# Patient Record
Sex: MALE | Race: WHITE
[De-identification: names, ages, dates, MRNs, and addresses within clinical notes are randomized per-mention and may not be internally consistent; named-entity substitution may affect disease eponyms.]

---

## 2018-02-21 ENCOUNTER — TRANSCRIPTION ENCOUNTER (OUTPATIENT)
Age: 28
End: 2018-02-21

## 2018-08-24 ENCOUNTER — TRANSCRIPTION ENCOUNTER (OUTPATIENT)
Age: 28
End: 2018-08-24

## 2018-09-01 ENCOUNTER — TRANSCRIPTION ENCOUNTER (OUTPATIENT)
Age: 28
End: 2018-09-01

## 2019-03-19 ENCOUNTER — TRANSCRIPTION ENCOUNTER (OUTPATIENT)
Age: 29
End: 2019-03-19

## 2019-04-03 ENCOUNTER — TRANSCRIPTION ENCOUNTER (OUTPATIENT)
Age: 29
End: 2019-04-03

## 2019-06-08 ENCOUNTER — TRANSCRIPTION ENCOUNTER (OUTPATIENT)
Age: 29
End: 2019-06-08

## 2020-01-29 ENCOUNTER — TRANSCRIPTION ENCOUNTER (OUTPATIENT)
Age: 30
End: 2020-01-29

## 2021-08-30 ENCOUNTER — TRANSCRIPTION ENCOUNTER (OUTPATIENT)
Age: 31
End: 2021-08-30

## 2021-11-09 PROBLEM — Z00.00 ENCOUNTER FOR PREVENTIVE HEALTH EXAMINATION: Status: ACTIVE | Noted: 2021-11-09

## 2021-12-03 ENCOUNTER — APPOINTMENT (OUTPATIENT)
Dept: CARDIOLOGY | Facility: CLINIC | Age: 31
End: 2021-12-03
Payer: COMMERCIAL

## 2021-12-03 VITALS
HEART RATE: 60 BPM | TEMPERATURE: 97.6 F | SYSTOLIC BLOOD PRESSURE: 110 MMHG | HEIGHT: 75 IN | BODY MASS INDEX: 31.33 KG/M2 | WEIGHT: 252 LBS | DIASTOLIC BLOOD PRESSURE: 70 MMHG

## 2021-12-03 DIAGNOSIS — Z78.9 OTHER SPECIFIED HEALTH STATUS: ICD-10-CM

## 2021-12-03 DIAGNOSIS — Z86.19 PERSONAL HISTORY OF OTHER INFECTIOUS AND PARASITIC DISEASES: ICD-10-CM

## 2021-12-03 PROCEDURE — 93000 ELECTROCARDIOGRAM COMPLETE: CPT

## 2021-12-03 PROCEDURE — 99204 OFFICE O/P NEW MOD 45 MIN: CPT

## 2021-12-06 PROBLEM — Z78.9 SOCIAL ALCOHOL USE: Status: ACTIVE | Noted: 2021-12-06

## 2021-12-06 PROBLEM — Z86.19 HISTORY OF SHINGLES: Status: RESOLVED | Noted: 2021-12-06 | Resolved: 2021-12-06

## 2021-12-06 NOTE — HISTORY OF PRESENT ILLNESS
[FreeTextEntry1] : 31 year old male () is here for chest pain evaluation. The pain is retrosternal described as uncomfortable feeling ,not related to exertion. He also reported that het gets short of breath easily. He also complains of palpations that feels like his heart stops and has to sit up almost every night. family history is significant for hyperlipidemia. \par \par \par \par \par \par

## 2021-12-06 NOTE — ASSESSMENT
[FreeTextEntry1] : 31 year old male () is here for chest pain evaluation. The pain is retrosternal described as uncomfortable feeling ,not related to exertion. He also reported that het gets short of breath easily. He also complains of palpations that feels like his heart stops and has to sit up almost every night. family history is significant for hyperlipidemia. \par \par Plan: \par CCTA metoprolol provided prior to the test\par Echocardiogram\par Patch monitor \par Lipid prifile \par D-dimer\par

## 2021-12-21 ENCOUNTER — APPOINTMENT (OUTPATIENT)
Dept: CARDIOLOGY | Facility: CLINIC | Age: 31
End: 2021-12-21
Payer: COMMERCIAL

## 2021-12-21 PROCEDURE — 93306 TTE W/DOPPLER COMPLETE: CPT

## 2022-01-04 LAB
BASOPHILS # BLD AUTO: 0.05 K/UL
BASOPHILS NFR BLD AUTO: 0.7 %
DEPRECATED D DIMER PPP IA-ACNC: 121 NG/ML DDU
EOSINOPHIL # BLD AUTO: 0.09 K/UL
EOSINOPHIL NFR BLD AUTO: 1.2 %
HCT VFR BLD CALC: 49.1 %
HGB BLD-MCNC: 16 G/DL
IMM GRANULOCYTES NFR BLD AUTO: 0.7 %
LYMPHOCYTES # BLD AUTO: 2.27 K/UL
LYMPHOCYTES NFR BLD AUTO: 29.6 %
MAN DIFF?: NORMAL
MCHC RBC-ENTMCNC: 30.1 PG
MCHC RBC-ENTMCNC: 32.6 G/DL
MCV RBC AUTO: 92.3 FL
MONOCYTES # BLD AUTO: 0.6 K/UL
MONOCYTES NFR BLD AUTO: 7.8 %
NEUTROPHILS # BLD AUTO: 4.61 K/UL
NEUTROPHILS NFR BLD AUTO: 60 %
PLATELET # BLD AUTO: 261 K/UL
RBC # BLD: 5.32 M/UL
RBC # FLD: 12.6 %
TSH SERPL-ACNC: 1.28 UIU/ML
WBC # FLD AUTO: 7.67 K/UL

## 2022-01-05 LAB
ALBUMIN SERPL ELPH-MCNC: 5 G/DL
ALP BLD-CCNC: 85 U/L
ALT SERPL-CCNC: 30 U/L
ANION GAP SERPL CALC-SCNC: 13 MMOL/L
AST SERPL-CCNC: 19 U/L
BILIRUB SERPL-MCNC: 0.6 MG/DL
BUN SERPL-MCNC: 17 MG/DL
CALCIUM SERPL-MCNC: 9.9 MG/DL
CHLORIDE SERPL-SCNC: 103 MMOL/L
CHOLEST SERPL-MCNC: 253 MG/DL
CO2 SERPL-SCNC: 24 MMOL/L
CREAT SERPL-MCNC: 1.1 MG/DL
ESTIMATED AVERAGE GLUCOSE: 100 MG/DL
GLUCOSE SERPL-MCNC: 97 MG/DL
HBA1C MFR BLD HPLC: 5.1 %
HDLC SERPL-MCNC: 35 MG/DL
LDLC SERPL CALC-MCNC: 182 MG/DL
NONHDLC SERPL-MCNC: 218 MG/DL
POTASSIUM SERPL-SCNC: 5.3 MMOL/L
PROT SERPL-MCNC: 7.9 G/DL
SODIUM SERPL-SCNC: 140 MMOL/L
TRIGL SERPL-MCNC: 243 MG/DL

## 2022-01-20 ENCOUNTER — RESULT REVIEW (OUTPATIENT)
Age: 32
End: 2022-01-20

## 2022-01-20 ENCOUNTER — OUTPATIENT (OUTPATIENT)
Dept: OUTPATIENT SERVICES | Facility: HOSPITAL | Age: 32
LOS: 1 days | Discharge: HOME | End: 2022-01-20
Payer: COMMERCIAL

## 2022-01-20 DIAGNOSIS — R06.00 DYSPNEA, UNSPECIFIED: ICD-10-CM

## 2022-01-20 DIAGNOSIS — R07.9 CHEST PAIN, UNSPECIFIED: ICD-10-CM

## 2022-01-20 PROCEDURE — 75574 CT ANGIO HRT W/3D IMAGE: CPT | Mod: 26

## 2022-01-21 ENCOUNTER — OUTPATIENT (OUTPATIENT)
Dept: OUTPATIENT SERVICES | Facility: HOSPITAL | Age: 32
LOS: 1 days | Discharge: HOME | End: 2022-01-21

## 2022-01-21 ENCOUNTER — RESULT REVIEW (OUTPATIENT)
Age: 32
End: 2022-01-21

## 2022-01-22 DIAGNOSIS — R06.00 DYSPNEA, UNSPECIFIED: ICD-10-CM

## 2022-01-22 DIAGNOSIS — R07.9 CHEST PAIN, UNSPECIFIED: ICD-10-CM

## 2022-02-04 ENCOUNTER — APPOINTMENT (OUTPATIENT)
Dept: CARDIOLOGY | Facility: CLINIC | Age: 32
End: 2022-02-04
Payer: COMMERCIAL

## 2022-02-04 VITALS
SYSTOLIC BLOOD PRESSURE: 110 MMHG | HEIGHT: 75 IN | WEIGHT: 250 LBS | BODY MASS INDEX: 31.08 KG/M2 | DIASTOLIC BLOOD PRESSURE: 70 MMHG | TEMPERATURE: 97.8 F

## 2022-02-04 DIAGNOSIS — E78.5 HYPERLIPIDEMIA, UNSPECIFIED: ICD-10-CM

## 2022-02-04 DIAGNOSIS — R07.9 CHEST PAIN, UNSPECIFIED: ICD-10-CM

## 2022-02-04 DIAGNOSIS — R06.00 DYSPNEA, UNSPECIFIED: ICD-10-CM

## 2022-02-04 PROCEDURE — 99214 OFFICE O/P EST MOD 30 MIN: CPT

## 2022-02-04 RX ORDER — METOPROLOL TARTRATE 50 MG/1
50 TABLET, FILM COATED ORAL DAILY
Qty: 2 | Refills: 0 | Status: DISCONTINUED | COMMUNITY
Start: 2021-12-03 | End: 2022-02-04

## 2022-02-04 NOTE — CARDIOLOGY SUMMARY
Pt was initially informed 4/4/19. Ade Alvarez, CMA     [de-identified] : 3/12/21 NSR  [de-identified] : 12/21/22 EF 60%  [de-identified] : 1/29/22 Non calcified plaque in prox LAD. Intramyocardial bridge noted mid/distal LAD.

## 2022-02-04 NOTE — ASSESSMENT
[FreeTextEntry1] : 31 year old male () is here for chest pain evaluation. The pain is retrosternal described as uncomfortable feeling ,not related to exertion. He also reported that het gets short of breath easily. He also complains of palpations that feels like his heart stops and has to sit up almost every night. family history is significant for hyperlipidemia. Underwent CCTA with FFRCT showing non obstructive CAD and intramyocardial bridge. Lipid profile showed significant LDL elevation. \par \par Plan: \par Discussed risk factor modification diet and exercise \par Did not start statin therapy and want to try diet and exercise first. Advised that risk factor modification may not be sufficient. \par Discussed the diagnosis of familiar hyperlipidemia and advised screening for 1st degree relatives \par Repeat Lipid profile and familial hyperlipemia workup \par If symptoms persist will consider exercise stress test to evaluate the effect of intramyocardial bridge\par \par

## 2022-02-04 NOTE — HISTORY OF PRESENT ILLNESS
[FreeTextEntry1] : 31 year old male () is here for chest pain evaluation. The pain is retrosternal described as uncomfortable feeling ,not related to exertion. He also reported that het gets short of breath easily. He also complains of palpations that feels like his heart stops and has to sit up almost every night. family history is significant for hyperlipidemia. Underwent CCTA with FFRCT showing non obstructive CAD and intramyocardial bridge. Lipid profile showed significant LDL elevation.

## 2022-02-09 ENCOUNTER — APPOINTMENT (OUTPATIENT)
Dept: CARDIOLOGY | Facility: CLINIC | Age: 32
End: 2022-02-09
Payer: COMMERCIAL

## 2022-02-09 VITALS
SYSTOLIC BLOOD PRESSURE: 110 MMHG | WEIGHT: 252 LBS | HEART RATE: 62 BPM | BODY MASS INDEX: 31.33 KG/M2 | OXYGEN SATURATION: 99 % | TEMPERATURE: 97.5 F | DIASTOLIC BLOOD PRESSURE: 70 MMHG | HEIGHT: 75 IN

## 2022-02-09 DIAGNOSIS — I44.1 ATRIOVENTRICULAR BLOCK, SECOND DEGREE: ICD-10-CM

## 2022-02-09 PROCEDURE — ZZZZZ: CPT

## 2022-02-09 PROCEDURE — 93000 ELECTROCARDIOGRAM COMPLETE: CPT

## 2022-02-09 PROCEDURE — 99215 OFFICE O/P EST HI 40 MIN: CPT

## 2022-02-09 RX ORDER — ATORVASTATIN CALCIUM 40 MG/1
40 TABLET, FILM COATED ORAL
Qty: 30 | Refills: 3 | Status: DISCONTINUED | COMMUNITY
Start: 2022-01-05 | End: 2022-02-09

## 2022-02-12 NOTE — HISTORY OF PRESENT ILLNESS
[FreeTextEntry1] : I had a pleasure of seeing Mr. DELGADO for consultation for abnormal holter. He works as a .\par \par Mr. DELGADO is a 31 year-year old male with history of DL, LAD bridge, untreated/late Rx of lyme's disease is here for abnormal holter.\par \par + Palpitations - sudden dizziness, brief moment, no clear ppt factor, for few months\par had monitor which showed events of Mobitz 2 AV block during sleeping hours.\par \par Denies chest pain, shortness of breath, palpitation, dizziness or LOC except noted above.\par \par FHx: No FHx of SCD, ICD implant, premature CAD\par SHx: No smoking, alcoholism, IVDA, marijuana use, excessive caffeinated products/herbal products\par \par EKG (2/9/22): SR@ 62, , QRSd 96\par TTE (12/21): Nl EF\par CCTA/CT-FFR (12/21): Non-obs lesion in LAD\par Cardio: Dr. Barrera\par

## 2022-02-12 NOTE — ASSESSMENT
[FreeTextEntry1] : ## Type 2 AV block\par ## Palpitations \par \par - Reviewed his prior MCOT which showed multiple events of Type 2 AV block- during sleeping hours. He continues to have symptoms- however,  he cant recall events during monitoring. Also, unclear if he had day time symptoms, which are currently happening. In this scenario, we discussed and agreed for repeat MCOT to correlate symptoms with events\par - I doubt that lyme disease is the cause of current symptoms\par - MRI to eval for sarcoidosis\par - MCOT for 4 weeks\par - return in 5 weeks

## 2022-03-02 ENCOUNTER — TRANSCRIPTION ENCOUNTER (OUTPATIENT)
Age: 32
End: 2022-03-02

## 2022-03-16 ENCOUNTER — APPOINTMENT (OUTPATIENT)
Dept: CARDIOLOGY | Facility: CLINIC | Age: 32
End: 2022-03-16
Payer: COMMERCIAL

## 2022-03-16 VITALS
HEART RATE: 72 BPM | BODY MASS INDEX: 31.33 KG/M2 | WEIGHT: 252 LBS | HEIGHT: 75 IN | TEMPERATURE: 98 F | SYSTOLIC BLOOD PRESSURE: 110 MMHG | DIASTOLIC BLOOD PRESSURE: 70 MMHG | OXYGEN SATURATION: 98 %

## 2022-03-16 PROCEDURE — 99214 OFFICE O/P EST MOD 30 MIN: CPT

## 2022-03-16 PROCEDURE — 93000 ELECTROCARDIOGRAM COMPLETE: CPT

## 2022-03-16 NOTE — HISTORY OF PRESENT ILLNESS
[FreeTextEntry1] : I had a pleasure of seeing Mr. DELGADO for consultation for abnormal holter. He works as a .\par \par Mr. 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 During monitor, minor episodes.\par \par Denies chest pain, shortness of breath, palpitation, dizziness or LOC except noted above.\par \par FHx: No FHx of SCD, ICD implant, premature CAD\par SHx: No smoking, alcoholism, IVDA, marijuana use, excessive caffeinated products/herbal products\par \par EKG (3/16/22): SR@ 87\par EKG (2/9/22): SR@ 62, , QRSd 96\par TTE (12/21): Nl EF\par CCTA/CT-FFR (12/21): Non-obs lesion in LAD\par Cardio: Dr. Barrera\par

## 2022-04-07 ENCOUNTER — TRANSCRIPTION ENCOUNTER (OUTPATIENT)
Age: 32
End: 2022-04-07

## 2022-04-16 RX ORDER — ATORVASTATIN CALCIUM 40 MG/1
40 TABLET, FILM COATED ORAL
Qty: 90 | Refills: 3 | Status: ACTIVE | COMMUNITY
Start: 2022-04-13 | End: 1900-01-01

## 2022-05-27 ENCOUNTER — NON-APPOINTMENT (OUTPATIENT)
Age: 32
End: 2022-05-27

## 2022-06-03 ENCOUNTER — APPOINTMENT (OUTPATIENT)
Dept: CARDIOLOGY | Facility: CLINIC | Age: 32
End: 2022-06-03

## 2022-06-28 ENCOUNTER — NON-APPOINTMENT (OUTPATIENT)
Age: 32
End: 2022-06-28

## 2022-06-28 ENCOUNTER — APPOINTMENT (OUTPATIENT)
Age: 32
End: 2022-06-28
Payer: COMMERCIAL

## 2022-06-28 VITALS
DIASTOLIC BLOOD PRESSURE: 76 MMHG | HEART RATE: 70 BPM | WEIGHT: 245 LBS | OXYGEN SATURATION: 97 % | SYSTOLIC BLOOD PRESSURE: 126 MMHG | HEIGHT: 75 IN | BODY MASS INDEX: 30.46 KG/M2 | RESPIRATION RATE: 14 BRPM

## 2022-06-28 DIAGNOSIS — G47.33 OBSTRUCTIVE SLEEP APNEA (ADULT) (PEDIATRIC): ICD-10-CM

## 2022-06-28 DIAGNOSIS — Z83.438 FAMILY HISTORY OF OTHER DISORDER OF LIPOPROTEIN METABOLISM AND OTHER LIPIDEMIA: ICD-10-CM

## 2022-06-28 DIAGNOSIS — Z86.39 PERSONAL HISTORY OF OTHER ENDOCRINE, NUTRITIONAL AND METABOLIC DISEASE: ICD-10-CM

## 2022-06-28 DIAGNOSIS — Z86.19 PERSONAL HISTORY OF OTHER INFECTIOUS AND PARASITIC DISEASES: ICD-10-CM

## 2022-06-28 PROCEDURE — 99203 OFFICE O/P NEW LOW 30 MIN: CPT

## 2022-06-28 NOTE — HISTORY OF PRESENT ILLNESS
[Initial Evaluation] : an initial evaluation of [Excessive Sleepiness-Day] : excessive daytime sleepiness [Witnessed Gasping] : witnessed gasping during sleep [Unrefreshing Sleep] : unrefreshing sleep [Currently Experiencing] : The patient is currently experiencing symptoms. [Gradual] : gradual [Moderate] : moderate in severity [Worsening] : worsening [Sleeping on Back] : sleeping on back [Fatigue] : fatigue [Weight Gain] : weight gain [Shortness of Breath] : shortness of breath [None] : The patient is not currently being treated for this problem [Cardiac Arrhythmia] : cardiac arrhythmia [TextBox_4] : REFERRED BY CARDIO RO GIGI REPORTS SYMPTOMS

## 2022-09-20 ENCOUNTER — APPOINTMENT (OUTPATIENT)
Dept: SLEEP CENTER | Facility: HOSPITAL | Age: 32
End: 2022-09-20

## 2022-09-20 ENCOUNTER — OUTPATIENT (OUTPATIENT)
Dept: OUTPATIENT SERVICES | Facility: HOSPITAL | Age: 32
LOS: 1 days | Discharge: HOME | End: 2022-09-20

## 2022-09-20 PROCEDURE — 95810 POLYSOM 6/> YRS 4/> PARAM: CPT | Mod: 26

## 2022-09-21 DIAGNOSIS — G47.33 OBSTRUCTIVE SLEEP APNEA (ADULT) (PEDIATRIC): ICD-10-CM

## 2023-03-29 ENCOUNTER — APPOINTMENT (OUTPATIENT)
Dept: OTOLARYNGOLOGY | Facility: CLINIC | Age: 33
End: 2023-03-29

## 2023-04-07 ENCOUNTER — NON-APPOINTMENT (OUTPATIENT)
Age: 33
End: 2023-04-07

## 2023-06-29 ENCOUNTER — EMERGENCY (EMERGENCY)
Facility: HOSPITAL | Age: 33
LOS: 0 days | Discharge: HOME | End: 2023-06-29
Attending: EMERGENCY MEDICINE
Payer: COMMERCIAL

## 2023-06-29 VITALS
DIASTOLIC BLOOD PRESSURE: 72 MMHG | RESPIRATION RATE: 18 BRPM | OXYGEN SATURATION: 99 % | SYSTOLIC BLOOD PRESSURE: 124 MMHG | HEART RATE: 72 BPM

## 2023-06-29 VITALS
WEIGHT: 250 LBS | OXYGEN SATURATION: 98 % | DIASTOLIC BLOOD PRESSURE: 70 MMHG | SYSTOLIC BLOOD PRESSURE: 126 MMHG | HEART RATE: 63 BPM | RESPIRATION RATE: 16 BRPM | TEMPERATURE: 98 F | HEIGHT: 75 IN

## 2023-06-29 DIAGNOSIS — R10.12 LEFT UPPER QUADRANT PAIN: ICD-10-CM

## 2023-06-29 DIAGNOSIS — R10.32 LEFT LOWER QUADRANT PAIN: ICD-10-CM

## 2023-06-29 DIAGNOSIS — M54.50 LOW BACK PAIN, UNSPECIFIED: ICD-10-CM

## 2023-06-29 DIAGNOSIS — R11.0 NAUSEA: ICD-10-CM

## 2023-06-29 DIAGNOSIS — E78.5 HYPERLIPIDEMIA, UNSPECIFIED: ICD-10-CM

## 2023-06-29 DIAGNOSIS — Z87.442 PERSONAL HISTORY OF URINARY CALCULI: ICD-10-CM

## 2023-06-29 LAB
ALBUMIN SERPL ELPH-MCNC: 4.7 G/DL — SIGNIFICANT CHANGE UP (ref 3.5–5.2)
ALP SERPL-CCNC: 75 U/L — SIGNIFICANT CHANGE UP (ref 30–115)
ALT FLD-CCNC: 34 U/L — SIGNIFICANT CHANGE UP (ref 0–41)
ANION GAP SERPL CALC-SCNC: 13 MMOL/L — SIGNIFICANT CHANGE UP (ref 7–14)
APPEARANCE UR: CLEAR — SIGNIFICANT CHANGE UP
AST SERPL-CCNC: 23 U/L — SIGNIFICANT CHANGE UP (ref 0–41)
BASOPHILS # BLD AUTO: 0.04 K/UL — SIGNIFICANT CHANGE UP (ref 0–0.2)
BASOPHILS NFR BLD AUTO: 0.5 % — SIGNIFICANT CHANGE UP (ref 0–1)
BILIRUB SERPL-MCNC: 0.6 MG/DL — SIGNIFICANT CHANGE UP (ref 0.2–1.2)
BILIRUB UR-MCNC: NEGATIVE — SIGNIFICANT CHANGE UP
BUN SERPL-MCNC: 17 MG/DL — SIGNIFICANT CHANGE UP (ref 10–20)
CALCIUM SERPL-MCNC: 9.6 MG/DL — SIGNIFICANT CHANGE UP (ref 8.4–10.5)
CHLORIDE SERPL-SCNC: 104 MMOL/L — SIGNIFICANT CHANGE UP (ref 98–110)
CO2 SERPL-SCNC: 23 MMOL/L — SIGNIFICANT CHANGE UP (ref 17–32)
COLOR SPEC: SIGNIFICANT CHANGE UP
CREAT SERPL-MCNC: 1 MG/DL — SIGNIFICANT CHANGE UP (ref 0.7–1.5)
DIFF PNL FLD: NEGATIVE — SIGNIFICANT CHANGE UP
EGFR: 101 ML/MIN/1.73M2 — SIGNIFICANT CHANGE UP
EOSINOPHIL # BLD AUTO: 0.09 K/UL — SIGNIFICANT CHANGE UP (ref 0–0.7)
EOSINOPHIL NFR BLD AUTO: 1.1 % — SIGNIFICANT CHANGE UP (ref 0–8)
GLUCOSE SERPL-MCNC: 110 MG/DL — HIGH (ref 70–99)
GLUCOSE UR QL: NEGATIVE — SIGNIFICANT CHANGE UP
HCT VFR BLD CALC: 43 % — SIGNIFICANT CHANGE UP (ref 42–52)
HGB BLD-MCNC: 14.7 G/DL — SIGNIFICANT CHANGE UP (ref 14–18)
IMM GRANULOCYTES NFR BLD AUTO: 0.4 % — HIGH (ref 0.1–0.3)
KETONES UR-MCNC: NEGATIVE — SIGNIFICANT CHANGE UP
LEUKOCYTE ESTERASE UR-ACNC: NEGATIVE — SIGNIFICANT CHANGE UP
LIDOCAIN IGE QN: 33 U/L — SIGNIFICANT CHANGE UP (ref 7–60)
LYMPHOCYTES # BLD AUTO: 2.41 K/UL — SIGNIFICANT CHANGE UP (ref 1.2–3.4)
LYMPHOCYTES # BLD AUTO: 30.6 % — SIGNIFICANT CHANGE UP (ref 20.5–51.1)
MCHC RBC-ENTMCNC: 30.6 PG — SIGNIFICANT CHANGE UP (ref 27–31)
MCHC RBC-ENTMCNC: 34.2 G/DL — SIGNIFICANT CHANGE UP (ref 32–37)
MCV RBC AUTO: 89.4 FL — SIGNIFICANT CHANGE UP (ref 80–94)
MONOCYTES # BLD AUTO: 0.55 K/UL — SIGNIFICANT CHANGE UP (ref 0.1–0.6)
MONOCYTES NFR BLD AUTO: 7 % — SIGNIFICANT CHANGE UP (ref 1.7–9.3)
NEUTROPHILS # BLD AUTO: 4.76 K/UL — SIGNIFICANT CHANGE UP (ref 1.4–6.5)
NEUTROPHILS NFR BLD AUTO: 60.4 % — SIGNIFICANT CHANGE UP (ref 42.2–75.2)
NITRITE UR-MCNC: NEGATIVE — SIGNIFICANT CHANGE UP
NRBC # BLD: 0 /100 WBCS — SIGNIFICANT CHANGE UP (ref 0–0)
PH UR: 6.5 — SIGNIFICANT CHANGE UP (ref 5–8)
PLATELET # BLD AUTO: 249 K/UL — SIGNIFICANT CHANGE UP (ref 130–400)
PMV BLD: 10.2 FL — SIGNIFICANT CHANGE UP (ref 7.4–10.4)
POTASSIUM SERPL-MCNC: 4.3 MMOL/L — SIGNIFICANT CHANGE UP (ref 3.5–5)
POTASSIUM SERPL-SCNC: 4.3 MMOL/L — SIGNIFICANT CHANGE UP (ref 3.5–5)
PROT SERPL-MCNC: 6.9 G/DL — SIGNIFICANT CHANGE UP (ref 6–8)
PROT UR-MCNC: NEGATIVE — SIGNIFICANT CHANGE UP
RBC # BLD: 4.81 M/UL — SIGNIFICANT CHANGE UP (ref 4.7–6.1)
RBC # FLD: 12.8 % — SIGNIFICANT CHANGE UP (ref 11.5–14.5)
SODIUM SERPL-SCNC: 140 MMOL/L — SIGNIFICANT CHANGE UP (ref 135–146)
SP GR SPEC: 1.02 — SIGNIFICANT CHANGE UP (ref 1.01–1.03)
UROBILINOGEN FLD QL: SIGNIFICANT CHANGE UP
WBC # BLD: 7.88 K/UL — SIGNIFICANT CHANGE UP (ref 4.8–10.8)
WBC # FLD AUTO: 7.88 K/UL — SIGNIFICANT CHANGE UP (ref 4.8–10.8)

## 2023-06-29 PROCEDURE — 96374 THER/PROPH/DIAG INJ IV PUSH: CPT | Mod: XU

## 2023-06-29 PROCEDURE — 74177 CT ABD & PELVIS W/CONTRAST: CPT | Mod: 26,MA

## 2023-06-29 PROCEDURE — 85025 COMPLETE CBC W/AUTO DIFF WBC: CPT

## 2023-06-29 PROCEDURE — 80053 COMPREHEN METABOLIC PANEL: CPT

## 2023-06-29 PROCEDURE — 36415 COLL VENOUS BLD VENIPUNCTURE: CPT

## 2023-06-29 PROCEDURE — 99284 EMERGENCY DEPT VISIT MOD MDM: CPT | Mod: 25

## 2023-06-29 PROCEDURE — 96376 TX/PRO/DX INJ SAME DRUG ADON: CPT

## 2023-06-29 PROCEDURE — 87086 URINE CULTURE/COLONY COUNT: CPT

## 2023-06-29 PROCEDURE — 83690 ASSAY OF LIPASE: CPT

## 2023-06-29 PROCEDURE — 99285 EMERGENCY DEPT VISIT HI MDM: CPT

## 2023-06-29 PROCEDURE — 74177 CT ABD & PELVIS W/CONTRAST: CPT | Mod: MA

## 2023-06-29 PROCEDURE — 81003 URINALYSIS AUTO W/O SCOPE: CPT

## 2023-06-29 RX ORDER — METHOCARBAMOL 500 MG/1
2 TABLET, FILM COATED ORAL
Qty: 18 | Refills: 0
Start: 2023-06-29 | End: 2023-07-01

## 2023-06-29 RX ORDER — SODIUM CHLORIDE 9 MG/ML
1000 INJECTION, SOLUTION INTRAVENOUS ONCE
Refills: 0 | Status: COMPLETED | OUTPATIENT
Start: 2023-06-29 | End: 2023-06-29

## 2023-06-29 RX ORDER — IBUPROFEN 200 MG
1 TABLET ORAL
Qty: 16 | Refills: 0
Start: 2023-06-29 | End: 2023-07-02

## 2023-06-29 RX ORDER — KETOROLAC TROMETHAMINE 30 MG/ML
15 SYRINGE (ML) INJECTION ONCE
Refills: 0 | Status: DISCONTINUED | OUTPATIENT
Start: 2023-06-29 | End: 2023-06-29

## 2023-06-29 RX ORDER — METHOCARBAMOL 500 MG/1
1000 TABLET, FILM COATED ORAL ONCE
Refills: 0 | Status: COMPLETED | OUTPATIENT
Start: 2023-06-29 | End: 2023-06-29

## 2023-06-29 RX ADMIN — SODIUM CHLORIDE 1000 MILLILITER(S): 9 INJECTION, SOLUTION INTRAVENOUS at 14:46

## 2023-06-29 RX ADMIN — Medication 15 MILLIGRAM(S): at 15:46

## 2023-06-29 RX ADMIN — METHOCARBAMOL 1000 MILLIGRAM(S): 500 TABLET, FILM COATED ORAL at 15:46

## 2023-06-29 RX ADMIN — Medication 15 MILLIGRAM(S): at 14:18

## 2023-06-29 NOTE — ED PROVIDER NOTE - CLINICAL SUMMARY MEDICAL DECISION MAKING FREE TEXT BOX
Pt with L lower back pain, neg labs and imaging, likely msk back pain.  symptomatic tx and f/u with pmd/pt/rehab.  Any ordered labs and EKG were reviewed.  Any imaging was ordered and reviewed by me.  Appropriate medications for patient's presenting complaints were ordered and effects were reassessed.  Patient's records (prior hospital, ED visit, and/or nursing home notes if available) were reviewed.  Additional history was obtained from EMS, family, and/or PCP (where available).  Escalation to admission/observation was considered.  1) However patient feels much better and is comfortable with discharge.  Appropriate follow-up was arranged.

## 2023-06-29 NOTE — ED PROVIDER NOTE - ATTENDING APP SHARED VISIT CONTRIBUTION OF CARE
33 yo m with pmh of kidney stones, f/u with nephro dr. gonzalez outpt, presents with L flank pain.  pt says started 2 days.  had nausea, but no vomiting.  no fever or chills.  admits worse with movement.  had golfed a few days ago.  no abd pain.  exam: nad, ncat, perrl, eomi, mmm, rrr, ctab, abd soft, nt, nd aox3, L cvat ttp imp: pt with h/o kidney stones, will check labs, ua, ct

## 2023-06-29 NOTE — ED PROVIDER NOTE - PATIENT PORTAL LINK FT
You can access the FollowMyHealth Patient Portal offered by [facility] by registering at the following website: [email or web address]. By joining [facility]’s FollowMyHealth portal, you will also be able to view your health information using other applications (apps) compatible with our system.

## 2023-06-29 NOTE — ED PROVIDER NOTE - OBJECTIVE STATEMENT
32-year-old female with PMH of renal stones, hyperlipidemia presents ED for evaluation of left-sided flank pain x2 days.  Patient states he was playing golf when pain occurred, it is sharp, intermittent, nonradiating, 6 out of 10 in severity currently but waxes and wanes and can be 9 out of 10 in severity. (+)nausea yesterday which has since resolved. Has been taking ibuprofen at home with intermittent relief.  Pain feels similar to prior kidney stones years ago however not as severe as last time.  Denies fever/chills, CP, SOB, abdominal pain, vomiting, diarrhea, black or bloody stools, testicular pain or swelling, rash.

## 2023-06-29 NOTE — ED PROVIDER NOTE - NS ED ATTENDING STATEMENT MOD
This was a shared visit with the REECE. I reviewed and verified the documentation and independently performed the documented:

## 2023-06-29 NOTE — ED PROVIDER NOTE - PHYSICAL EXAMINATION
VITAL SIGNS: I have reviewed nursing notes and confirm.  CONSTITUTIONAL: Well-developed; well-nourished; in no acute distress.  SKIN: Skin exam is warm and dry, no acute rash.  HEAD: Normocephalic; atraumatic.  EYES: PERRL, Econjunctiva and sclera clear.  ENT: mmm  CARD: S1, S2 normal; no murmurs, gallops, or rubs. Regular rate and rhythm.  RESP: Normal respiratory effort, no tachypnea or distress. Lungs CTAB, no wheezes, rales or rhonchi.  ABD: soft, ND, +minimal suprapubic ttp without rebound/guarding/rigidity. Back with (+)L CVAT no R CVAT.   EXT: Normal ROM. No clubbing, cyanosis or edema.  NEURO: Alert, oriented. Grossly unremarkable. No focal deficits.  PSYCH: Cooperative, appropriate.

## 2023-06-29 NOTE — ED ADULT NURSE NOTE - NSFALLUNIVINTERV_ED_ALL_ED
Bed/Stretcher in lowest position, wheels locked, appropriate side rails in place/Call bell, personal items and telephone in reach/Instruct patient to call for assistance before getting out of bed/chair/stretcher/Non-slip footwear applied when patient is off stretcher/[place] to call system/Physically safe environment - no spills, clutter or unnecessary equipment/Purposeful proactive rounding/Room/bathroom lighting operational, light cord in reach

## 2023-06-29 NOTE — ED PROVIDER NOTE - CARE PROVIDER_API CALL
Ana Pool  [facility]  [address] [place] [address]  Phone: [phone]  Fax: [phone]  Established Patient  Follow Up Time: 1-3 Days

## 2023-06-29 NOTE — ED PROVIDER NOTE - PROGRESS NOTE DETAILS
INDIRA: Patient feels improved after Toradol, IV fluids.  Labs, UA, CT of abdomen and pelvis are all WNL.  No signs of infection or stone.  Discussed with patient, Toradol and Robaxin sent to pharmacy.  Supportive care return precautions advised.  Patient comfortable with plan.    Patient to be discharged from ED. Any available test results were discussed with patient and/or family. Verbal instructions given, including instructions to return to ED immediately for any new, worsening, or concerning symptoms. Patient endorsed understanding. Written discharge instructions additionally given, including follow-up plan.

## 2023-06-29 NOTE — ED ADULT NURSE NOTE - OBJECTIVE STATEMENT
Received report from prior RN.     Pt presenting to ED c/o flank pain. Pt c/o left sided flank pain x2 days with associated nausea. Denies d/fevers/chills. Pt A&OX4, ambulatory.

## 2023-07-01 LAB
CULTURE RESULTS: NO GROWTH — SIGNIFICANT CHANGE UP
SPECIMEN SOURCE: SIGNIFICANT CHANGE UP

## 2023-09-19 ENCOUNTER — NON-APPOINTMENT (OUTPATIENT)
Age: 33
End: 2023-09-19

## 2025-01-02 ENCOUNTER — NON-APPOINTMENT (OUTPATIENT)
Age: 35
End: 2025-01-02

## 2025-06-18 ENCOUNTER — NON-APPOINTMENT (OUTPATIENT)
Age: 35
End: 2025-06-18

## 2025-06-23 ENCOUNTER — INPATIENT (INPATIENT)
Facility: HOSPITAL | Age: 35
LOS: 0 days | Discharge: HOME | DRG: 603 | End: 2025-06-24
Attending: INTERNAL MEDICINE | Admitting: STUDENT IN AN ORGANIZED HEALTH CARE EDUCATION/TRAINING PROGRAM
Payer: COMMERCIAL

## 2025-06-23 VITALS
OXYGEN SATURATION: 98 % | HEIGHT: 77 IN | RESPIRATION RATE: 18 BRPM | HEART RATE: 66 BPM | DIASTOLIC BLOOD PRESSURE: 83 MMHG | WEIGHT: 248.9 LBS | SYSTOLIC BLOOD PRESSURE: 121 MMHG | TEMPERATURE: 97 F

## 2025-06-23 DIAGNOSIS — L03.90 CELLULITIS, UNSPECIFIED: ICD-10-CM

## 2025-06-23 PROBLEM — E78.5 HYPERLIPIDEMIA, UNSPECIFIED: Chronic | Status: ACTIVE | Noted: 2023-06-29

## 2025-06-23 PROBLEM — N20.0 CALCULUS OF KIDNEY: Chronic | Status: ACTIVE | Noted: 2023-06-29

## 2025-06-23 LAB
ALBUMIN SERPL ELPH-MCNC: 5.1 G/DL — SIGNIFICANT CHANGE UP (ref 3.5–5.2)
ALP SERPL-CCNC: 100 U/L — SIGNIFICANT CHANGE UP (ref 30–115)
ALT FLD-CCNC: 25 U/L — SIGNIFICANT CHANGE UP (ref 0–41)
ANION GAP SERPL CALC-SCNC: 13 MMOL/L — SIGNIFICANT CHANGE UP (ref 7–14)
AST SERPL-CCNC: 18 U/L — SIGNIFICANT CHANGE UP (ref 0–41)
BASOPHILS # BLD AUTO: 0.05 K/UL — SIGNIFICANT CHANGE UP (ref 0–0.2)
BASOPHILS NFR BLD AUTO: 0.6 % — SIGNIFICANT CHANGE UP (ref 0–1)
BILIRUB SERPL-MCNC: 0.5 MG/DL — SIGNIFICANT CHANGE UP (ref 0.2–1.2)
BUN SERPL-MCNC: 16 MG/DL — SIGNIFICANT CHANGE UP (ref 10–20)
CALCIUM SERPL-MCNC: 9.9 MG/DL — SIGNIFICANT CHANGE UP (ref 8.4–10.5)
CHLORIDE SERPL-SCNC: 102 MMOL/L — SIGNIFICANT CHANGE UP (ref 98–110)
CO2 SERPL-SCNC: 26 MMOL/L — SIGNIFICANT CHANGE UP (ref 17–32)
CREAT SERPL-MCNC: 1.1 MG/DL — SIGNIFICANT CHANGE UP (ref 0.7–1.5)
CRP SERPL-MCNC: 3.8 MG/L — SIGNIFICANT CHANGE UP
EGFR: 89 ML/MIN/1.73M2 — SIGNIFICANT CHANGE UP
EGFR: 89 ML/MIN/1.73M2 — SIGNIFICANT CHANGE UP
EOSINOPHIL # BLD AUTO: 0.05 K/UL — SIGNIFICANT CHANGE UP (ref 0–0.7)
EOSINOPHIL NFR BLD AUTO: 0.6 % — SIGNIFICANT CHANGE UP (ref 0–8)
ERYTHROCYTE [SEDIMENTATION RATE] IN BLOOD: 20 MM/HR — HIGH (ref 0–15)
GLUCOSE SERPL-MCNC: 82 MG/DL — SIGNIFICANT CHANGE UP (ref 70–99)
HCT VFR BLD CALC: 44.8 % — SIGNIFICANT CHANGE UP (ref 42–52)
HGB BLD-MCNC: 15 G/DL — SIGNIFICANT CHANGE UP (ref 14–18)
IMM GRANULOCYTES NFR BLD AUTO: 0.6 % — HIGH (ref 0.1–0.3)
LACTATE SERPL-SCNC: 1.1 MMOL/L — SIGNIFICANT CHANGE UP (ref 0.7–2)
LYMPHOCYTES # BLD AUTO: 1.97 K/UL — SIGNIFICANT CHANGE UP (ref 1.2–3.4)
LYMPHOCYTES # BLD AUTO: 24.8 % — SIGNIFICANT CHANGE UP (ref 20.5–51.1)
MCHC RBC-ENTMCNC: 29.8 PG — SIGNIFICANT CHANGE UP (ref 27–31)
MCHC RBC-ENTMCNC: 33.5 G/DL — SIGNIFICANT CHANGE UP (ref 32–37)
MCV RBC AUTO: 88.9 FL — SIGNIFICANT CHANGE UP (ref 80–94)
MONOCYTES # BLD AUTO: 0.62 K/UL — HIGH (ref 0.1–0.6)
MONOCYTES NFR BLD AUTO: 7.8 % — SIGNIFICANT CHANGE UP (ref 1.7–9.3)
NEUTROPHILS # BLD AUTO: 5.2 K/UL — SIGNIFICANT CHANGE UP (ref 1.4–6.5)
NEUTROPHILS NFR BLD AUTO: 65.6 % — SIGNIFICANT CHANGE UP (ref 42.2–75.2)
NRBC BLD AUTO-RTO: 0 /100 WBCS — SIGNIFICANT CHANGE UP (ref 0–0)
PLATELET # BLD AUTO: 290 K/UL — SIGNIFICANT CHANGE UP (ref 130–400)
PMV BLD: 9.9 FL — SIGNIFICANT CHANGE UP (ref 7.4–10.4)
POTASSIUM SERPL-MCNC: 4.7 MMOL/L — SIGNIFICANT CHANGE UP (ref 3.5–5)
POTASSIUM SERPL-SCNC: 4.7 MMOL/L — SIGNIFICANT CHANGE UP (ref 3.5–5)
PROT SERPL-MCNC: 7.7 G/DL — SIGNIFICANT CHANGE UP (ref 6–8)
RBC # BLD: 5.04 M/UL — SIGNIFICANT CHANGE UP (ref 4.7–6.1)
RBC # FLD: 12.3 % — SIGNIFICANT CHANGE UP (ref 11.5–14.5)
SODIUM SERPL-SCNC: 141 MMOL/L — SIGNIFICANT CHANGE UP (ref 135–146)
WBC # BLD: 7.94 K/UL — SIGNIFICANT CHANGE UP (ref 4.8–10.8)
WBC # FLD AUTO: 7.94 K/UL — SIGNIFICANT CHANGE UP (ref 4.8–10.8)

## 2025-06-23 PROCEDURE — 86618 LYME DISEASE ANTIBODY: CPT

## 2025-06-23 PROCEDURE — 36415 COLL VENOUS BLD VENIPUNCTURE: CPT

## 2025-06-23 PROCEDURE — 99223 1ST HOSP IP/OBS HIGH 75: CPT

## 2025-06-23 PROCEDURE — 73564 X-RAY EXAM KNEE 4 OR MORE: CPT | Mod: 26,LT

## 2025-06-23 PROCEDURE — 99285 EMERGENCY DEPT VISIT HI MDM: CPT

## 2025-06-23 PROCEDURE — 73590 X-RAY EXAM OF LOWER LEG: CPT | Mod: 26,LT

## 2025-06-23 RX ORDER — SODIUM CHLORIDE 9 G/1000ML
1000 INJECTION, SOLUTION INTRAVENOUS ONCE
Refills: 0 | Status: COMPLETED | OUTPATIENT
Start: 2025-06-23 | End: 2025-06-23

## 2025-06-23 RX ORDER — VANCOMYCIN HCL IN 5 % DEXTROSE 1.5G/250ML
1000 PLASTIC BAG, INJECTION (ML) INTRAVENOUS ONCE
Refills: 0 | Status: COMPLETED | OUTPATIENT
Start: 2025-06-23 | End: 2025-06-23

## 2025-06-23 RX ORDER — MELATONIN 5 MG
3 TABLET ORAL AT BEDTIME
Refills: 0 | Status: DISCONTINUED | OUTPATIENT
Start: 2025-06-23 | End: 2025-06-24

## 2025-06-23 RX ORDER — VANCOMYCIN HCL IN 5 % DEXTROSE 1.5G/250ML
1750 PLASTIC BAG, INJECTION (ML) INTRAVENOUS EVERY 12 HOURS
Refills: 0 | Status: DISCONTINUED | OUTPATIENT
Start: 2025-06-23 | End: 2025-06-24

## 2025-06-23 RX ORDER — ACETAMINOPHEN 500 MG/5ML
650 LIQUID (ML) ORAL EVERY 6 HOURS
Refills: 0 | Status: DISCONTINUED | OUTPATIENT
Start: 2025-06-23 | End: 2025-06-24

## 2025-06-23 RX ORDER — ATORVASTATIN CALCIUM 80 MG/1
40 TABLET, FILM COATED ORAL AT BEDTIME
Refills: 0 | Status: DISCONTINUED | OUTPATIENT
Start: 2025-06-23 | End: 2025-06-24

## 2025-06-23 RX ORDER — IBUPROFEN 200 MG
600 TABLET ORAL ONCE
Refills: 0 | Status: COMPLETED | OUTPATIENT
Start: 2025-06-23 | End: 2025-06-23

## 2025-06-23 RX ORDER — ATORVASTATIN CALCIUM 80 MG/1
1 TABLET, FILM COATED ORAL
Refills: 0 | DISCHARGE

## 2025-06-23 RX ADMIN — SODIUM CHLORIDE 1000 MILLILITER(S): 9 INJECTION, SOLUTION INTRAVENOUS at 14:02

## 2025-06-23 RX ADMIN — Medication 250 MILLIGRAM(S): at 14:02

## 2025-06-23 RX ADMIN — Medication 600 MILLIGRAM(S): at 14:02

## 2025-06-23 RX ADMIN — ATORVASTATIN CALCIUM 40 MILLIGRAM(S): 80 TABLET, FILM COATED ORAL at 21:03

## 2025-06-23 RX ADMIN — SODIUM CHLORIDE 1000 MILLILITER(S): 9 INJECTION, SOLUTION INTRAVENOUS at 15:05

## 2025-06-23 NOTE — H&P ADULT - ATTENDING COMMENTS
Agree with the above. Discussed case with resident at length. Adjusments/Amendments made above accordingly.

## 2025-06-23 NOTE — H&P ADULT - ASSESSMENT
35 y/o M pmhx HLD and Lyme dz (was treated last year a/ an antibiotic other than doxy for intolerance, but does not recall the name. p/w LLE cellulitis. resistant to keflex and bactrim. found to be afebrile, with no WBC count X ray of knee and tib/fib -ve. CRP -ve, and lactate -ve.     #LLE cellulitis     35 y/o M pmhx HLD and Lyme dz (was treated last year w/ an antibiotic other than doxy for intolerance, but does not recall the name. p/w LLE cellulitis. resistant to keflex and bactrim. found to be afebrile, with no WBC count X ray of knee and tib/fib -ve. CRP -ve, and lactate -ve.     #LLE cellulitis       35 y/o M pmhx HLD and Lyme dz (was treated last year w/ an antibiotic other than doxy for intolerance, but does not recall the name. p/w LLE cellulitis. resistant to keflex and bactrim. found to be afebrile, with no WBC count X ray of knee and tib/fib -ve. CRP -ve, and lactate -ve.     #LLE cellulitis   #H/O of Lyme dz   - Denies recent travel or hiking or tick bites  - failed bactrim and keflex  - Although on PE, infection borders seem to have diminished   - Afebrile, WBC's wnl, CRP -ve, Lactate -ve  - X ray knee and tib/fib -ve  - Reports purulent discharge from wound and satellite lesions on tibial tuberosity  - Can not tolerate doxycycline   - c/w IV vancomycin to cover for MRSA  - f/u bcx  - f/u MRSA  -  Will check Lyme serologies although will not  of acute cellulitis given no tick bites or hiking    #HLD  - c/w atorvastatin 40 mg      #DVT PPx- NI  #GI PPx- NI  #Diet- DASH  #Dispo- Home pending cellulitis treatment on IV vancomycin, no need for daily labs   #Code- FULL                    35 y/o M pmhx HLD and Lyme dz (was treated last year w/ an antibiotic other than doxy for intolerance, but does not recall the name. p/w LLE cellulitis. resistant to keflex and bactrim. found to be afebrile, with no WBC count X ray of knee and tib/fib -ve. CRP -ve, and lactate -ve.     #LLE cellulitis that reportedly failed outpatient po abx - no sepsis poa  - vitals and labs on admission stable  - imaging w no evidence of acute fracture or dislocation. Normal alignment. No radiographic evidence of acute osteomyelitis. No subcutaneous soft tissue gas or inadvertent radiopaque foreign body.  - s/p Vanc in ED > cont for now  - pending labs/cultures and consult ID for input  - otherwise provide supportive care w analgesics and antipyretics along w lwc dailly    #H/o Lymes Disease  - lymes titers noted in ED and ID consulted for above    #HLD / Non obstructive CAD  - s/p ccta 01/2022 which showed non obstructive CAD and intramyocardial bridge  - cont home statin and check lipid panel    #H/o Type 2 AV block  - s/p mcot (2022) which showed type 2 AV block during sleeping hours but no significant events with symptoms or during awake time  - saw cardio as outpatinet who recommended sleep study  - No PPM at this time - discussed option of ILR for long-term monitoring for dizziness but patient did not want to proceed  - outpatient cardio and pulm follow up     #H/o Nephrolithiasis  - encourage po hydration    #DVT PPx- lovenox  #GI PPx- NI  #Diet- DASH  #Dispo- Home pending cellulitis treatment on IV vancomycin, no need for daily labs   #Code- FULL                    35 y/o M pmhx HLD and Lyme dz (was treated last year w/ an antibiotic other than doxy for intolerance, but does not recall the name. p/w LLE cellulitis. resistant to keflex and bactrim. found to be afebrile, with no WBC count X ray of knee and tib/fib -ve. CRP -ve, and lactate -ve.     #LLE cellulitis that reportedly failed outpatient po abx - no sepsis poa  - vitals and labs on admission stable  - imaging w no evidence of acute fracture or dislocation. Normal alignment. No radiographic evidence of acute osteomyelitis. No subcutaneous soft tissue gas or inadvertent radiopaque foreign body.  - s/p Vanc in ED > cont for now - patinet unable to tolerate doxy  - pending labs/cultures and consult ID for input as well as d/c abx regimen  - otherwise provide supportive care w analgesics and antipyretics along w lwc dailly    #H/o Lymes Disease  - lymes titers noted in ED and ID consulted for above    #HLD / Non obstructive CAD  - s/p ccta 01/2022 which showed non obstructive CAD and intramyocardial bridge  - cont home statin and check lipid panel    #H/o Type 2 AV block  - s/p mcot (2022) which showed type 2 AV block during sleeping hours but no significant events with symptoms or during awake time  - saw cardio as outpatinet who recommended sleep study  - No PPM at this time - discussed option of ILR for long-term monitoring for dizziness but patient did not want to proceed  - outpatient cardio and pulm follow up     #H/o Nephrolithiasis  - encourage po hydration    #DVT PPx- lovenox  #GI PPx- NI  #Diet- DASH  #Dispo- Home pending cellulitis treatment on IV vancomycin, no need for daily labs   #Code- FULL

## 2025-06-23 NOTE — H&P ADULT - HISTORY OF PRESENT ILLNESS
35 y/o M pmhx HLD and Lyme dz p/w LLE cellulitis. Pt had a fall 6/15 went to urgent care 6/19 where he was given Bactrum DS BID and keflex 500 TID last dose 6/23 w/ no improvement. Reports going to PMD who sent him to the hospital for worseining cellulitis. Pt reports some purulent Satalite lesions that were draining a greenish discharge. Endorsed a subjective fever of 100.0 at home. Denies any cp, cough, dizziness lightheadedness, SOB, abd pain, diarrhea/constipation.     Vitals:  T(F): 98.2   HR: 61   BP: 112/68   RR: 18   SpO2: 98%     SIG labs: WBC's wnl, CRP -ve, lactate -ve    Imaging: XRAY knee and tib/fib -ve    ED interventions: 1LR bolus and 1g vancomycin IV   35 y/o M pmhx HLD and Lyme dz (was treated last year a/ an antibiotic other than doxy for intolerance, but does not recall the name. p/w LLE cellulitis. Pt had a fall 6/15, went to urgent care 6/19 where he was given Bactrim DS BID and keflex 500 TID last dose 6/23 w/ no improvement. Reports going to PMD who sent him to the hospital for worsening cellulitis. Pt reports some purulent Satalite lesions that were draining a greenish discharge. Endorsed a subjective fever of 100.0 at home. Denies any cp, cough, dizziness lightheadedness, SOB, abd pain, diarrhea/constipation, tick bites, recent hiking, or recent travel      Vitals:  T(F): 98.2   HR: 61   BP: 112/68   RR: 18   SpO2: 98%     SIG labs: WBC's wnl, CRP -ve, lactate -ve    Imaging: XRAY knee and tib/fib -ve    ED interventions: 1LR bolus and 1g vancomycin IV   35 y/o M pmhx HLD and Lyme dz (was treated last year w/ an antibiotic other than doxy for intolerance, but does not recall the name. p/w LLE cellulitis. Pt had a fall 6/15, went to urgent care 6/19 where he was given Bactrim DS BID and keflex 500 TID last dose 6/23 w/ no improvement altho based on infection markings, the surrounding erythema decreased in size. Reports going to PMD who sent him to the hospital for worsening cellulitis. Pt reports some purulent Satalite lesions that were draining a greenish discharge. Endorsed a subjective fever of 100.0 at home. Denies any cp, cough, dizziness lightheadedness, SOB, abd pain, diarrhea/constipation, tick bites, recent hiking, or recent travel      Vitals:  T(F): 98.2   HR: 61   BP: 112/68   RR: 18   SpO2: 98%     SIG labs: WBC's wnl, CRP -ve, lactate -ve    Imaging: XRAY knee and tib/fib -ve    ED interventions: 1LR bolus and 1g vancomycin IV

## 2025-06-23 NOTE — ED PROVIDER NOTE - PHYSICAL EXAMINATION
VITAL SIGNS: I have reviewed nursing notes and confirm.  CONSTITUTIONAL: In no acute distress.  SKIN: Skin exam is warm and dry. Wound to the left lateral shin with overlying yellow scabbing, 2 smaller pustules surrounding the larger wound, with streaking erythema and warmth down the left shin.  HEAD: Normocephalic; atraumatic.  EYES: PERRL, EOM intact; conjunctiva and sclera clear.  NECK: Supple; non tender.  CARD: S1, S2; Regular rate and rhythm.  RESP: CTAB. Speaking in full sentences.   ABD: Soft, non-tender.   EXT: Normal ROM. TTP to the left knee, ROM intact. DP 2+.   NEURO: Alert, oriented. Grossly unremarkable. No focal deficits.

## 2025-06-23 NOTE — ED PROVIDER NOTE - ATTENDING APP SHARED VISIT CONTRIBUTION OF CARE
34-year-old male past medical history of Lyme disease hyperlipidemia presents with left lower extremity wound.  Patient states he was playing baseball and got scraped with the grass on June 18.  Seen on June 19 at outside hospital, was given dose of IV antibiotics in the ED and started on p.o. antibiotics as outpatient.  Was taking Bactrim and Keflex.  Patient complains of streaking and small pustules and reports temperature of 100 last night associated with chills headache and nausea.  No cough chest pain shortness of breath vomiting diarrhea.  No numbness weakness.    On exam, AFVSS, Well appearing, No acute distress, NCAT, EOMI, PERRLA, MMM, Neck supple, LCTAB, RRR nl s1s2 No mrg, Abdomen Soft NTND, AAOx3, No Focal Deficits, No LE edema or calf TTP, left lower leg wound with surrounding erythema and 2 small pustules, mild warmth, mild tenderness throughout, no crepitus, 5 out of 5 motor sensation tact light touch, soft compartments, 2+ DP pulse    A/P; cellulitis failed outpatient treatment labs x-ray culture admit for IV antibiotics

## 2025-06-23 NOTE — H&P ADULT - NSHPPHYSICALEXAM_GEN_ALL_CORE
GENERAL: NAD,   HEAD:  Atraumatic, normocephalic  EYES: EOMI, PERRLA, conjunctiva and sclera clear  NECK: Supple, trachea midline  HEART: Regular rate and rhythm, no murmurs, rubs, or gallops  LUNGS: Unlabored respirations.  Clear to auscultation bilaterally, no crackles, wheezing, or rhonchi  ABDOMEN: Soft, nontender, nondistended, +BS  EXTREMITIES: LLE later crusted lesion with surrounding erythema and mild warmth, some satellite lesions one of which is on the tibial tuberosity which pt reports was draining greenish discharge   NERVOUS SYSTEM:  A&Ox3, moving all extremities, no focal deficits

## 2025-06-23 NOTE — ED PROVIDER NOTE - OBJECTIVE STATEMENT
Patient is a 34-year-old male [facility] HLD who presents ED with complaints of left lower extremity cellulitis with failure of outpatient p.o. antibiotics.  Patient was seen at urgent care on 6/18 for left lower extremity redness and pain after scraping his leg siding on the grass with playing baseball.  Patient was sent to [facility] given IV antibiotics and discharged from the ED with Bactrim and Keflex.  Patient was seen by PCP today with concerns of worsening infection, with the presence of streaking erythema and small pustules surrounding the larger wound.  Patient reports fever (100 °F) chills, headache, nausea.  Denies CP, SOB, vomiting, abdominal pain, urinary symptoms.

## 2025-06-23 NOTE — ED PROVIDER NOTE - CLINICAL SUMMARY MEDICAL DECISION MAKING FREE TEXT BOX
34-year-old male past medical history of Lyme disease hyperlipidemia presents with left lower extremity wound.  Patient states he was playing baseball and got scraped with the grass on June 18.  Seen on June 19 at outside hospital, was given dose of IV antibiotics in the ED and started on p.o. antibiotics as outpatient.  Was taking Bactrim and Keflex.  Patient complains of streaking and small pustules and reports temperature of 100 last night associated with chills headache and nausea.  No cough chest pain shortness of breath vomiting diarrhea.  No numbness weakness.    On exam, AFVSS, Well appearing, No acute distress, NCAT, EOMI, PERRLA, MMM, Neck supple, LCTAB, RRR nl s1s2 No mrg, Abdomen Soft NTND, AAOx3, No Focal Deficits, No LE edema or calf TTP, left lower leg wound with surrounding erythema and 2 small pustules, mild warmth, mild tenderness throughout, no crepitus, 5 out of 5 motor sensation tact light touch, soft compartments, 2+ DP pulse    A/P; cellulitis failed outpatient treatment labs x-ray culture admit for IV antibiotics    Labs  were ordered and reviewed.  Imaging was ordered and reviewed by me.  Appropriate medications for patient's presenting complaints were ordered and effects were reassessed.  Patient's records (prior hospital, ED visit, and/or nursing home notes if available) were reviewed.  Additional history was obtained from EMS, family, and/or PCP (where available).  Escalation to admission/observation was considered.  Patient requires inpatient hospitalization - monitored setting.

## 2025-06-24 ENCOUNTER — TRANSCRIPTION ENCOUNTER (OUTPATIENT)
Age: 35
End: 2025-06-24

## 2025-06-24 VITALS
TEMPERATURE: 98 F | SYSTOLIC BLOOD PRESSURE: 100 MMHG | HEART RATE: 59 BPM | RESPIRATION RATE: 18 BRPM | OXYGEN SATURATION: 97 % | DIASTOLIC BLOOD PRESSURE: 65 MMHG

## 2025-06-24 PROCEDURE — 99239 HOSP IP/OBS DSCHRG MGMT >30: CPT

## 2025-06-24 RX ORDER — SULFAMETHOXAZOLE/TRIMETHOPRIM 800-160 MG
1 TABLET ORAL
Qty: 10 | Refills: 0
Start: 2025-06-24 | End: 2025-06-28

## 2025-06-24 RX ADMIN — Medication 3 MILLIGRAM(S): at 01:35

## 2025-06-24 RX ADMIN — Medication 250 MILLIGRAM(S): at 05:44

## 2025-06-24 RX ADMIN — Medication 1 APPLICATION(S): at 05:49

## 2025-06-24 NOTE — DISCHARGE NOTE PROVIDER - NSDCMRMEDTOKEN_GEN_ALL_CORE_FT
atorvastatin 40 mg oral tablet: 1 tab(s) orally once a day (at bedtime)  Bactrim  mg-160 mg oral tablet: 1 tab(s) orally 2 times a day

## 2025-06-24 NOTE — DISCHARGE NOTE PROVIDER - ATTENDING DISCHARGE PHYSICAL EXAMINATION:
GERRY DELGADO  34y Male    INTERVAL HPI/OVERNIGHT EVENTS:    pt feels well and is comfortable in going home today on PO abx  left LE with decreasing erythema, mild warmth, scab on shin and 2 small pustules (one with mild white drainage)  no fever, chills    T(F): 97.6 (06-24-25 @ 05:10), Max: 98.2 (06-23-25 @ 15:10)  HR: 64 (06-24-25 @ 05:10) (58 - 66)  BP: 109/65 (06-24-25 @ 05:10) (109/65 - 122/64)  RR: 18 (06-24-25 @ 05:10) (18 - 18)  SpO2: 98% (06-24-25 @ 05:10) (97% - 99%)      PHYSICAL EXAM:  GENERAL: NAD  HEAD:  Normocephalic  EYES:  conjunctiva and sclera clear  ENMT: Moist mucous membranes  NERVOUS SYSTEM:  Alert, awake, Good concentration  CHEST/LUNG: CTA b/l; No rales, rhonchi, wheezing  HEART: Regular rate and rhythm; No murmurs  ABDOMEN: Soft, Nontender, Nondistended  EXTREMITIES:   No edema  SKIN: left LE with decreasing region of erythema, scab over shin, and 2 small pustules - one with mild drainage    Consultant(s) Notes Reviewed:  [x ] YES  [ ] NO  Care Discussed with Consultants/Other Providers [ x] YES  [ ] NO    MEDICATIONS  (STANDING):  atorvastatin 40 milliGRAM(s) Oral at bedtime  chlorhexidine 2% Cloths 1 Application(s) Topical <User Schedule>  vancomycin  IVPB 1750 milliGRAM(s) IV Intermittent every 12 hours    MEDICATIONS  (PRN):  acetaminophen     Tablet .. 650 milliGRAM(s) Oral every 6 hours PRN Temp greater or equal to 38C (100.4F), Mild Pain (1 - 3)  melatonin 3 milliGRAM(s) Oral at bedtime PRN Insomnia      LABS:                        15.0   7.94  )-----------( 290      ( 23 Jun 2025 14:06 )             44.8     06-23    141  |  102  |  16  ----------------------------<  82  4.7   |  26  |  1.1    Ca    9.9      23 Jun 2025 14:06    TPro  7.7  /  Alb  5.1  /  TBili  0.5  /  DBili  x   /  AST  18  /  ALT  25  /  AlkPhos  100  06-23        Lactate, Blood: 1.1 mmol/L (06-23 @ 14:06)          RADIOLOGY & ADDITIONAL TESTS:    Imaging report Personally Reviewed:  [x ] YES  [ ] NO      Case discussed with residents and RN on rounds today    Care discussed with pt    35 y/o man with cellulitis of left LE - started on Bactrim and keflex as outpt with some improvement but developed 2 small pustules so PMD sent him to ER for evaluation.  No sepsis and imaging negative for OM. Pt did note improvement on PO abx. He was started on IV vancomycin and cellulitis continues to improve.  Will discharge home today on PO bactrim to complete at least 10 day course total. He was informed to seek medical attention for worsening symptoms, fever, chills.   Pt understood and was in agreement with the discharge plan.   previous notes reviewed by me.

## 2025-06-24 NOTE — DISCHARGE NOTE PROVIDER - PROVIDER TOKENS
PROVIDER:[TOKEN:[[id number]],FOLLOWUP:[1 week]],PROVIDER:[TOKEN:[[id number]],FOLLOWUP:[2 weeks]]

## 2025-06-24 NOTE — DISCHARGE NOTE NURSING/CASE MANAGEMENT/SOCIAL WORK - PATIENT PORTAL LINK FT
You can access the FollowMyHealth Patient Portal offered by [facility] by registering at the following website: [email or web address]. By joining [facility] FollowMyHealth portal, you will also be able to view your health information using other applications (apps) compatible with our system.

## 2025-06-24 NOTE — PATIENT PROFILE ADULT - LEGAL HELP
----- Message from Soy Jackson sent at 12/29/2022  3:20 PM CST -----  Type:  Needs Medical Advice    Who Called: sister  Reason:regarding scheduling her an appointment  Would the patient rather a call back or a response via [facility]?     Additional Information: [phone]     no

## 2025-06-24 NOTE — DISCHARGE NOTE PROVIDER - CARE PROVIDERS DIRECT ADDRESSES
,DirectAddress_Unknown,[email or web address]

## 2025-06-24 NOTE — DISCHARGE NOTE PROVIDER - HOSPITAL COURSE
35 y/o M pmhx HLD and Lyme dz (was treated last year w/ an antibiotic other than doxy for intolerance, but does not recall the name. p/w LLE cellulitis. resistant to keflex and bactrim. found to be afebrile, with no WBC count X ray of knee and tib/fib -ve. CRP -ve, and lactate -ve. Was admitted on IV vancomycin for cellulitis, but on further eval reported improvement on the bactrim that they were previously on, will be discharged on PO bactrim BID x 5 days     #LLE cellulitis that reportedly failed outpatient po abx - no sepsis poa  - vitals and labs on admission stable  - imaging w no evidence of acute fracture or dislocation. Normal alignment. No radiographic evidence of acute osteomyelitis. No subcutaneous soft tissue gas or inadvertent radiopaque foreign body.  - s/p Vanc in ED > cont for now - patinet unable to tolerate doxy  - Will be AK'ed on bactrim bid x 5 days     #H/o Lymes Disease  - op f/u     #HLD / Non obstructive CAD  - s/p ccta 01/2022 which showed non obstructive CAD and intramyocardial bridge  - cont home statin   - op f/u     #H/o Type 2 AV block  - s/p mcot (2022) which showed type 2 AV block during sleeping hours but no significant events with symptoms or during awake time  - saw cardio as outpatinet who recommended sleep study  - No PPM at this time - discussed option of ILR for long-term monitoring for dizziness but patient did not want to proceed  - outpatient cardio f/u     #H/o Nephrolithiasis  - encourage po hydration

## 2025-06-24 NOTE — DISCHARGE NOTE NURSING/CASE MANAGEMENT/SOCIAL WORK - FINANCIAL ASSISTANCE
[facility] provides services at a reduced cost to those who are determined to be eligible through [facility]’s financial assistance program. Information regarding [facility]’s financial assistance program can be found by going to [email or web address] or by calling [phone].

## 2025-06-24 NOTE — DISCHARGE NOTE PROVIDER - CARE PROVIDER_API CALL
Ana Pool  Family Medicine  [address] [place] [id number]  Phone: [phone]  Fax: [phone]  Follow Up Time: 1 week    Alexandre Kim  Cardiovascular Disease  [address] [place] [address], [facility] [address]  [place] [address]  Phone: [phone]  Fax: [phone]  Follow Up Time: 2 weeks

## 2025-06-25 LAB
B BURGDOR C6 AB SER-ACNC: NEGATIVE — SIGNIFICANT CHANGE UP
B BURGDOR IGG+IGM SER-ACNC: 0.66 INDEX — SIGNIFICANT CHANGE UP (ref 0.01–0.9)

## 2025-06-28 LAB
CULTURE RESULTS: SIGNIFICANT CHANGE UP
CULTURE RESULTS: SIGNIFICANT CHANGE UP
SPECIMEN SOURCE: SIGNIFICANT CHANGE UP
SPECIMEN SOURCE: SIGNIFICANT CHANGE UP

## 2025-06-30 DIAGNOSIS — Z87.442 PERSONAL HISTORY OF URINARY CALCULI: ICD-10-CM

## 2025-06-30 DIAGNOSIS — Z86.19 PERSONAL HISTORY OF OTHER INFECTIOUS AND PARASITIC DISEASES: ICD-10-CM

## 2025-06-30 DIAGNOSIS — L03.116 CELLULITIS OF LEFT LOWER LIMB: ICD-10-CM

## 2025-06-30 DIAGNOSIS — I25.10 ATHEROSCLEROTIC HEART DISEASE OF NATIVE CORONARY ARTERY WITHOUT ANGINA PECTORIS: ICD-10-CM

## 2025-06-30 DIAGNOSIS — I44.1 ATRIOVENTRICULAR BLOCK, SECOND DEGREE: ICD-10-CM

## 2025-06-30 DIAGNOSIS — E78.5 HYPERLIPIDEMIA, UNSPECIFIED: ICD-10-CM

## 2025-07-17 ENCOUNTER — NON-APPOINTMENT (OUTPATIENT)
Age: 35
End: 2025-07-17

## 2025-07-17 ENCOUNTER — APPOINTMENT (OUTPATIENT)
Dept: CARDIOLOGY | Facility: CLINIC | Age: 35
End: 2025-07-17
Payer: COMMERCIAL

## 2025-07-17 VITALS
HEIGHT: 75 IN | WEIGHT: 245 LBS | BODY MASS INDEX: 30.46 KG/M2 | SYSTOLIC BLOOD PRESSURE: 106 MMHG | DIASTOLIC BLOOD PRESSURE: 70 MMHG

## 2025-07-17 VITALS — HEART RATE: 67 BPM

## 2025-07-17 PROBLEM — I25.10 CORONARY ARTERY DISEASE, NON-OCCLUSIVE: Status: ACTIVE | Noted: 2025-07-17

## 2025-07-17 PROCEDURE — 93000 ELECTROCARDIOGRAM COMPLETE: CPT

## 2025-07-17 PROCEDURE — 99204 OFFICE O/P NEW MOD 45 MIN: CPT | Mod: 25

## 2025-08-18 ENCOUNTER — NON-APPOINTMENT (OUTPATIENT)
Age: 35
End: 2025-08-18